# Patient Record
Sex: MALE | Race: WHITE | Employment: UNEMPLOYED | URBAN - METROPOLITAN AREA
[De-identification: names, ages, dates, MRNs, and addresses within clinical notes are randomized per-mention and may not be internally consistent; named-entity substitution may affect disease eponyms.]

---

## 2017-01-24 ENCOUNTER — HOSPITAL ENCOUNTER (EMERGENCY)
Age: 62
Discharge: HOME | End: 2017-01-24
Attending: EMERGENCY MEDICINE | Admitting: EMERGENCY MEDICINE
Payer: SELF-PAY

## 2017-01-24 ENCOUNTER — APPOINTMENT (OUTPATIENT)
Dept: GENERAL RADIOLOGY | Age: 62
End: 2017-01-24
Attending: EMERGENCY MEDICINE
Payer: SELF-PAY

## 2017-01-24 VITALS
BODY MASS INDEX: 22.53 KG/M2 | SYSTOLIC BLOOD PRESSURE: 120 MMHG | HEIGHT: 73 IN | WEIGHT: 170 LBS | OXYGEN SATURATION: 99 % | DIASTOLIC BLOOD PRESSURE: 72 MMHG | TEMPERATURE: 98.1 F | HEART RATE: 83 BPM | RESPIRATION RATE: 18 BRPM

## 2017-01-24 DIAGNOSIS — M19.90 ARTHRITIS: Primary | ICD-10-CM

## 2017-01-24 DIAGNOSIS — M25.511 PAIN IN JOINT OF RIGHT SHOULDER: ICD-10-CM

## 2017-01-24 PROCEDURE — 96372 THER/PROPH/DIAG INJ SC/IM: CPT

## 2017-01-24 PROCEDURE — 99282 EMERGENCY DEPT VISIT SF MDM: CPT

## 2017-01-24 PROCEDURE — 74011250636 HC RX REV CODE- 250/636: Performed by: EMERGENCY MEDICINE

## 2017-01-24 PROCEDURE — 73030 X-RAY EXAM OF SHOULDER: CPT

## 2017-01-24 RX ORDER — NAPROXEN 500 MG/1
500 TABLET ORAL 2 TIMES DAILY WITH MEALS
Qty: 20 TAB | Refills: 0 | Status: SHIPPED | OUTPATIENT
Start: 2017-01-24

## 2017-01-24 RX ORDER — HYDROCODONE BITARTRATE AND ACETAMINOPHEN 5; 325 MG/1; MG/1
TABLET ORAL
Qty: 12 TAB | Refills: 0 | Status: SHIPPED | OUTPATIENT
Start: 2017-01-24

## 2017-01-24 RX ORDER — KETOROLAC TROMETHAMINE 30 MG/ML
60 INJECTION, SOLUTION INTRAMUSCULAR; INTRAVENOUS
Status: COMPLETED | OUTPATIENT
Start: 2017-01-24 | End: 2017-01-24

## 2017-01-24 RX ADMIN — KETOROLAC TROMETHAMINE 60 MG: 30 INJECTION, SOLUTION INTRAMUSCULAR at 11:54

## 2017-01-24 NOTE — DISCHARGE INSTRUCTIONS
Arthritis: Care Instructions  Your Care Instructions  Arthritis, also called osteoarthritis, is a breakdown of the cartilage that cushions your joints. When the cartilage wears down, your bones rub against each other. This causes pain and stiffness. Many people have some arthritis as they age. Arthritis most often affects the joints of the spine, hands, hips, knees, or feet. You can take simple measures to protect your joints, ease your pain, and help you stay active. Follow-up care is a key part of your treatment and safety. Be sure to make and go to all appointments, and call your doctor if you are having problems. It's also a good idea to know your test results and keep a list of the medicines you take. How can you care for yourself at home? · Stay at a healthy weight. Being overweight puts extra strain on your joints. · Talk to your doctor or physical therapist about exercises that will help ease joint pain. ¨ Stretch. You may enjoy gentle forms of yoga to help keep your joints and muscles flexible. ¨ Walk instead of jog. Other types of exercise that are less stressful on the joints include riding a bicycle, swimming, or water exercise. ¨ Lift weights. Strong muscles help reduce stress on your joints. Stronger thigh muscles, for example, take some of the stress off of the knees and hips. Learn the right way to lift weights so you do not make joint pain worse. · Take your medicines exactly as prescribed. Call your doctor if you think you are having a problem with your medicine. · Take pain medicines exactly as directed. ¨ If the doctor gave you a prescription medicine for pain, take it as prescribed. ¨ If you are not taking a prescription pain medicine, ask your doctor if you can take an over-the-counter medicine. · Use a cane, crutch, walker, or another device if you need help to get around. These can help rest your joints.  You also can use other things to make life easier, such as a higher toilet seat and padded handles on kitchen utensils. · Do not sit in low chairs, which can make it hard to get up. · Put heat or cold on your sore joints as needed. Use whichever helps you most. You also can take turns with hot and cold packs. ¨ Apply heat 2 or 3 times a day for 20 to 30 minutes--using a heating pad, hot shower, or hot pack--to relieve pain and stiffness. ¨ Put ice or a cold pack on your sore joint for 10 to 20 minutes at a time. Put a thin cloth between the ice and your skin. When should you call for help? Call your doctor now or seek immediate medical care if:  · You have sudden swelling, warmth, or pain in any joint. · You have joint pain and a fever or rash. · You have such bad pain that you cannot use a joint. Watch closely for changes in your health, and be sure to contact your doctor if:  · You have mild joint symptoms that continue even with more than 6 weeks of care at home. · You have stomach pain or other problems with your medicine. Where can you learn more? Go to http://faithJibbigomichelle.info/. Enter T936 in the search box to learn more about \"Arthritis: Care Instructions. \"  Current as of: February 24, 2016  Content Version: 11.1  © 7178-1598 [employer]. Care instructions adapted under license by [employer] (which disclaims liability or warranty for this information). If you have questions about a medical condition or this instruction, always ask your healthcare professional. [place] [address] any warranty or liability for your use of this information. Joint Pain: Care Instructions  Your Care Instructions  Many people have small aches and pains from overuse or injury to muscles and joints. Joint injuries often happen during sports or recreation, work tasks, or projects around the home.  An overuse injury can happen when you put too much stress on a joint or when you do an activity that stresses the joint over and over, such as using the computer or rowing a boat. You can take action at home to help your muscles and joints get better. You should feel better in 1 to 2 weeks, but it can take 3 months or more to heal completely. Follow-up care is a key part of your treatment and safety. Be sure to make and go to all appointments, and call your doctor if you are having problems. It's also a good idea to know your test results and keep a list of the medicines you take. How can you care for yourself at home? · Do not put weight on the injured joint for at least a day or two. · For the first day or two after an injury, do not take hot showers or baths, and do not use hot packs. The heat could make swelling worse. · Put ice or a cold pack on the sore joint for 10 to 20 minutes at a time. Try to do this every 1 to 2 hours for the next 3 days (when you are awake) or until the swelling goes down. Put a thin cloth between the ice and your skin. · Wrap the injury in an elastic bandage. Do not wrap it too tightly because this can cause more swelling. · Prop up the sore joint on a pillow when you ice it or anytime you sit or lie down during the next 3 days. Try to keep it above the level of your heart. This will help reduce swelling. · Take an over-the-counter pain medicine, such as acetaminophen (Tylenol), ibuprofen (Advil, Motrin), or naproxen (Aleve). Read and follow all instructions on the label. · After 1 or 2 days of rest, begin moving the joint gently. While the joint is still healing, you can begin to exercise using activities that do not strain or hurt the painful joint. When should you call for help? Call your doctor now or seek immediate medical care if:  · You have signs of infection, such as:  ¨ Increased pain, swelling, warmth, and redness. ¨ Red streaks leading from the joint. ¨ A fever.   Watch closely for changes in your health, and be sure to contact your doctor if:  · Your movement or symptoms are not getting better after 1 to 2 weeks of home treatment. Where can you learn more? Go to http://faith-michelle.info/. Enter P205 in the search box to learn more about \"Joint Pain: Care Instructions. \"  Current as of: May 23, 2016  Content Version: 11.1  © 5214-2981 [employer]. Care instructions adapted under license by [employer] (which disclaims liability or warranty for this information). If you have questions about a medical condition or this instruction, always ask your healthcare professional. [address] any warranty or liability for your use of this information.

## 2017-05-09 ENCOUNTER — HOSPITAL ENCOUNTER (EMERGENCY)
Age: 62
Discharge: HOME | End: 2017-05-09
Attending: EMERGENCY MEDICINE
Payer: SELF-PAY

## 2017-05-09 VITALS
HEART RATE: 71 BPM | TEMPERATURE: 98.3 F | HEIGHT: 72 IN | RESPIRATION RATE: 16 BRPM | WEIGHT: 155 LBS | OXYGEN SATURATION: 99 % | SYSTOLIC BLOOD PRESSURE: 113 MMHG | DIASTOLIC BLOOD PRESSURE: 79 MMHG | BODY MASS INDEX: 20.99 KG/M2

## 2017-05-09 DIAGNOSIS — M25.511 CHRONIC RIGHT SHOULDER PAIN: ICD-10-CM

## 2017-05-09 DIAGNOSIS — Z76.0 MEDICATION REFILL: Primary | ICD-10-CM

## 2017-05-09 DIAGNOSIS — G89.29 CHRONIC RIGHT SHOULDER PAIN: ICD-10-CM

## 2017-05-09 PROCEDURE — 99282 EMERGENCY DEPT VISIT SF MDM: CPT

## 2017-05-09 PROCEDURE — 77030029684 HC NEB SM VOL KT MONA -A

## 2017-05-09 PROCEDURE — 96372 THER/PROPH/DIAG INJ SC/IM: CPT

## 2017-05-09 PROCEDURE — 74011250636 HC RX REV CODE- 250/636: Performed by: PHYSICIAN ASSISTANT

## 2017-05-09 PROCEDURE — 94640 AIRWAY INHALATION TREATMENT: CPT

## 2017-05-09 PROCEDURE — 74011000250 HC RX REV CODE- 250: Performed by: PHYSICIAN ASSISTANT

## 2017-05-09 RX ORDER — ALBUTEROL SULFATE 90 UG/1
2 AEROSOL, METERED RESPIRATORY (INHALATION)
Status: DISCONTINUED | OUTPATIENT
Start: 2017-05-09 | End: 2017-05-09 | Stop reason: HOSPADM

## 2017-05-09 RX ORDER — IPRATROPIUM BROMIDE AND ALBUTEROL SULFATE 2.5; .5 MG/3ML; MG/3ML
3 SOLUTION RESPIRATORY (INHALATION)
Status: COMPLETED | OUTPATIENT
Start: 2017-05-09 | End: 2017-05-09

## 2017-05-09 RX ORDER — METHOCARBAMOL 500 MG/1
500 TABLET, FILM COATED ORAL 4 TIMES DAILY
Qty: 20 TAB | Refills: 0 | Status: SHIPPED | OUTPATIENT
Start: 2017-05-09

## 2017-05-09 RX ORDER — KETOROLAC TROMETHAMINE 30 MG/ML
60 INJECTION, SOLUTION INTRAMUSCULAR; INTRAVENOUS
Status: COMPLETED | OUTPATIENT
Start: 2017-05-09 | End: 2017-05-09

## 2017-05-09 RX ORDER — ALBUTEROL SULFATE 90 UG/1
2 AEROSOL, METERED RESPIRATORY (INHALATION)
Qty: 1 INHALER | Refills: 0 | Status: SHIPPED | OUTPATIENT
Start: 2017-05-09 | End: 2017-08-26

## 2017-05-09 RX ORDER — ALBUTEROL SULFATE 0.83 MG/ML
2.5 SOLUTION RESPIRATORY (INHALATION)
Qty: 30 EACH | Refills: 0 | Status: SHIPPED | OUTPATIENT
Start: 2017-05-09 | End: 2017-08-26

## 2017-05-09 RX ADMIN — IPRATROPIUM BROMIDE AND ALBUTEROL SULFATE 3 ML: .5; 3 SOLUTION RESPIRATORY (INHALATION) at 11:27

## 2017-05-09 RX ADMIN — KETOROLAC TROMETHAMINE 60 MG: 30 INJECTION, SOLUTION INTRAMUSCULAR at 11:27

## 2017-05-09 NOTE — DISCHARGE INSTRUCTIONS
Joint Pain: Care Instructions  Your Care Instructions  Many people have small aches and pains from overuse or injury to muscles and joints. Joint injuries often happen during sports or recreation, work tasks, or projects around the home. An overuse injury can happen when you put too much stress on a joint or when you do an activity that stresses the joint over and over, such as using the computer or rowing a boat. You can take action at home to help your muscles and joints get better. You should feel better in 1 to 2 weeks, but it can take 3 months or more to heal completely. Follow-up care is a key part of your treatment and safety. Be sure to make and go to all appointments, and call your doctor if you are having problems. It's also a good idea to know your test results and keep a list of the medicines you take. How can you care for yourself at home? · Do not put weight on the injured joint for at least a day or two. · For the first day or two after an injury, do not take hot showers or baths, and do not use hot packs. The heat could make swelling worse. · Put ice or a cold pack on the sore joint for 10 to 20 minutes at a time. Try to do this every 1 to 2 hours for the next 3 days (when you are awake) or until the swelling goes down. Put a thin cloth between the ice and your skin. · Wrap the injury in an elastic bandage. Do not wrap it too tightly because this can cause more swelling. · Prop up the sore joint on a pillow when you ice it or anytime you sit or lie down during the next 3 days. Try to keep it above the level of your heart. This will help reduce swelling. · Take an over-the-counter pain medicine, such as acetaminophen (Tylenol), ibuprofen (Advil, Motrin), or naproxen (Aleve). Read and follow all instructions on the label. · After 1 or 2 days of rest, begin moving the joint gently.  While the joint is still healing, you can begin to exercise using activities that do not strain or hurt the painful joint. When should you call for help? Call your doctor now or seek immediate medical care if:  · You have signs of infection, such as:  ¨ Increased pain, swelling, warmth, and redness. ¨ Red streaks leading from the joint. ¨ A fever. Watch closely for changes in your health, and be sure to contact your doctor if:  · Your movement or symptoms are not getting better after 1 to 2 weeks of home treatment. Where can you learn more? Go to http://faith-michelle.info/. Enter P205 in the search box to learn more about \"Joint Pain: Care Instructions. \"  Current as of: May 23, 2016  Content Version: 11.2  © 9277-4030 [employer]. Care instructions adapted under license by [employer] (which disclaims liability or warranty for this information). If you have questions about a medical condition or this instruction, always ask your healthcare professional. [address] any warranty or liability for your use of this information.

## 2017-05-09 NOTE — ED PROVIDER NOTES
HPI Comments: 10:34 AM Jane Zarate is a 60 y.o. male with a h/o COPD, and Smoking presents to the ED for albuterol refill. Pt states his is almost out of his albuterol inhaler and is completely out of nebulizer solution. He feels his COPD has been doing well overall. Also c/o ongoing right shoulder pain, states he received an injection for it in the past which helped. Pt denies nausea, vomiting, diarrhea, chest pain, or cough. All other sx denied. No other complaints at this time. PCP-None      The history is provided by the patient. Past Medical History:   Diagnosis Date    Chronic obstructive pulmonary disease ([facility] [address]     Tobacco abuse        No past surgical history on file. No family history on file. Social History     Social History    Marital status: SINGLE     Spouse name: N/A    Number of children: N/A    Years of education: N/A     Occupational History    Not on file. Social History Main Topics    Smoking status: Former Smoker     Packs/day: 0.00     Years: 30.00     Types: Cigarettes     Start date: 10/11/1968    Smokeless tobacco: Never Used    Alcohol use No    Drug use: No    Sexual activity: Yes     Partners: Female     Other Topics Concern    Not on file     Social History Narrative         ALLERGIES: Review of patient's allergies indicates no known allergies. Review of Systems   Constitutional: Negative for fever. Eyes: Negative for visual disturbance. Respiratory: Negative for shortness of breath. Cardiovascular: Negative for chest pain and leg swelling. Gastrointestinal: Negative for abdominal pain, blood in stool and vomiting. Genitourinary: Negative for dysuria and flank pain. Musculoskeletal: Positive for arthralgias. Negative for neck pain. Skin: Negative for rash. Neurological: Negative for headaches. Hematological: Does not bruise/bleed easily. Psychiatric/Behavioral: Negative for confusion.    All other systems reviewed and are negative. Vitals:    05/09/17 0956   BP: 113/79   Pulse: 71   Resp: 16   Temp: 98.3 °F (36.8 °C)   SpO2: 99%   Weight: 70.3 kg (155 lb)   Height: 6' (1.829 m)            Physical Exam   Constitutional: He is oriented to person, place, and time. He appears well-developed and well-nourished. No distress. HENT:   Head: Normocephalic and atraumatic. Eyes: Conjunctivae are normal.   Neck: Normal range of motion. Neck supple. Cardiovascular: Normal rate, regular rhythm and normal heart sounds. Pulmonary/Chest: Effort normal. No respiratory distress. He has wheezes (mild). He has no rales. Musculoskeletal: Normal range of motion. Neurological: He is alert and oriented to person, place, and time. Skin: Skin is warm and dry. Psychiatric: He has a normal mood and affect. His behavior is normal. Judgment and thought content normal.   Nursing note and vitals reviewed. MDM  Number of Diagnoses or Management Options  Chronic right shoulder pain:   Medication refill:     ED Course       Procedures                       Scribe Attestation  Mane Link scribing for and in the presence of Inessa Martin 10:36 AM, 05/09/17. Physician Attestation  I personally performed the services described in the documentation, reviewed the documentation, as recorded by the scribe in my presence, and it accurately and completely records my words and actions. ITA Martin 10:36 AM 05/09/17        Signed by : Brain Nguyen, 05/09/17 at 10:36 AM        -------------------------------------------------------------------------------------------------------------------     EKG INTERPRETATIONS:      RADIOLOGY RESULTS:   No orders to display       LABORATORY RESULTS:  No results found for this or any previous visit (from the past 12 hour(s)). CONSULTATIONS:        PROGRESS NOTES:    10:48 AM Pt well appearing and in NAD. Here for refill of meds. Gave toradol IM, muscle relaxers.  Referred to [facility] for further management. Lengthy D/W pt regarding possible worsening of pt's condition, need for follow up and strict ED return instructions for any worsening symptoms. DISPOSITION:  ED DIAGNOSIS & DISPOSITION INFORMATION  Diagnosis:   1. Medication refill    2. Chronic right shoulder pain          Disposition: home    Follow-up Information     Follow up With Details Comments [address] [facility] Call today To make a follow up appointment [address] [facility] [address] [phone]    [facility] EMERGENCY DEPT  Immediately if symptoms worsen [address] [facility] [address]  [phone]          Patient's Medications   Start Taking    ALBUTEROL (PROVENTIL HFA, VENTOLIN HFA, PROAIR HFA) 90 MCG/ACTUATION INHALER    Take 2 Puffs by inhalation every four (4) hours as needed for Wheezing. ALBUTEROL (PROVENTIL VENTOLIN) 2.5 MG /3 ML (0.083 %) NEBULIZER SOLUTION    3 mL by Nebulization route every four (4) hours as needed for Wheezing. METHOCARBAMOL (ROBAXIN) 500 MG TABLET    Take 1 Tab by mouth four (4) times daily. Continue Taking    ALBUTEROL (PROVENTIL VENTOLIN) 2.5 MG /3 ML (0.083 %) NEBULIZER SOLUTION    3 mL by Nebulization route every four (4) hours as needed for Wheezing. ASPIRIN 81 MG CHEWABLE TABLET    Take 81 mg by mouth daily. DIPHENHYDRAMINE (BENADRYL) 25 MG CAPSULE    Take 1 Cap by mouth every six (6) hours as needed. Indications: ALLERGIC REACTION, PRURITUS OF SKIN    FLUTICASONE (FLOVENT HFA) 110 MCG/ACTUATION INHALER    Take 1 Puff by inhalation every twelve (12) hours. HYDROCODONE-ACETAMINOPHEN (NORCO) 5-325 MG PER TABLET    Take 1-2 tablets PO every 4-6 hours as needed for pain control. If over the counter ibuprofen or acetaminophen was suggested, then only take the vicodin for pain not well controlled with the over the counter medication. METHOCARBAMOL (ROBAXIN) 500 MG TABLET    Take 1 Tab by mouth four (4) times daily. NAPROXEN (NAPROSYN) 500 MG TABLET    Take 1 Tab by mouth two (2) times daily (with meals). PREDNISONE (STERAPRED DS) 10 MG DOSE PACK    6 day dose pack per package instructions    TRIAMCINOLONE ACETONIDE (KENALOG) 0.1 % OINTMENT    Apply  to affected area two (2) times a day.  use thin layer   These Medications have changed    No medications on file   Stop Taking    No medications on file

## 2017-05-09 NOTE — ED TRIAGE NOTES
Shoulder pain right side has been hurting off and on since September. Has been seen here several times for the shoulder. States that he also is about out of his albuterol inhaler.

## 2017-08-26 ENCOUNTER — HOSPITAL ENCOUNTER (EMERGENCY)
Age: 62
Discharge: HOME | End: 2017-08-26
Attending: EMERGENCY MEDICINE | Admitting: EMERGENCY MEDICINE
Payer: SELF-PAY

## 2017-08-26 ENCOUNTER — APPOINTMENT (OUTPATIENT)
Dept: GENERAL RADIOLOGY | Age: 62
End: 2017-08-26
Attending: EMERGENCY MEDICINE
Payer: SELF-PAY

## 2017-08-26 VITALS
OXYGEN SATURATION: 98 % | TEMPERATURE: 98.7 F | HEART RATE: 76 BPM | SYSTOLIC BLOOD PRESSURE: 141 MMHG | DIASTOLIC BLOOD PRESSURE: 96 MMHG | RESPIRATION RATE: 18 BRPM

## 2017-08-26 DIAGNOSIS — Z87.891 CESSATION OF TOBACCO USE IN PREVIOUS 12 MONTHS: ICD-10-CM

## 2017-08-26 DIAGNOSIS — J44.1 COPD EXACERBATION (HCC): Primary | ICD-10-CM

## 2017-08-26 PROCEDURE — 74011000250 HC RX REV CODE- 250: Performed by: EMERGENCY MEDICINE

## 2017-08-26 PROCEDURE — 93005 ELECTROCARDIOGRAM TRACING: CPT

## 2017-08-26 PROCEDURE — 71010 XR CHEST PORT: CPT

## 2017-08-26 PROCEDURE — 74011636637 HC RX REV CODE- 636/637: Performed by: EMERGENCY MEDICINE

## 2017-08-26 PROCEDURE — 74011250637 HC RX REV CODE- 250/637: Performed by: PHYSICIAN ASSISTANT

## 2017-08-26 PROCEDURE — 94640 AIRWAY INHALATION TREATMENT: CPT

## 2017-08-26 PROCEDURE — 99284 EMERGENCY DEPT VISIT MOD MDM: CPT

## 2017-08-26 RX ORDER — ALBUTEROL SULFATE 0.83 MG/ML
5 SOLUTION RESPIRATORY (INHALATION)
Status: COMPLETED | OUTPATIENT
Start: 2017-08-26 | End: 2017-08-26

## 2017-08-26 RX ORDER — PREDNISONE 20 MG/1
60 TABLET ORAL
Status: COMPLETED | OUTPATIENT
Start: 2017-08-26 | End: 2017-08-26

## 2017-08-26 RX ORDER — ALBUTEROL SULFATE 90 UG/1
1 AEROSOL, METERED RESPIRATORY (INHALATION)
Qty: 1 INHALER | Refills: 1 | Status: SHIPPED | OUTPATIENT
Start: 2017-08-26

## 2017-08-26 RX ORDER — ALBUTEROL SULFATE 90 UG/1
2 AEROSOL, METERED RESPIRATORY (INHALATION)
Status: DISCONTINUED | OUTPATIENT
Start: 2017-08-26 | End: 2017-08-26 | Stop reason: HOSPADM

## 2017-08-26 RX ORDER — ALBUTEROL SULFATE 0.83 MG/ML
2.5 SOLUTION RESPIRATORY (INHALATION)
Qty: 30 EACH | Refills: 0 | Status: SHIPPED | OUTPATIENT
Start: 2017-08-26

## 2017-08-26 RX ORDER — IPRATROPIUM BROMIDE AND ALBUTEROL SULFATE 2.5; .5 MG/3ML; MG/3ML
3 SOLUTION RESPIRATORY (INHALATION)
Status: COMPLETED | OUTPATIENT
Start: 2017-08-26 | End: 2017-08-26

## 2017-08-26 RX ORDER — PREDNISONE 50 MG/1
50 TABLET ORAL DAILY
Qty: 5 TAB | Refills: 0 | Status: SHIPPED | OUTPATIENT
Start: 2017-08-26 | End: 2017-08-31

## 2017-08-26 RX ADMIN — PREDNISONE 60 MG: 20 TABLET ORAL at 06:18

## 2017-08-26 RX ADMIN — ALBUTEROL SULFATE 2 PUFF: 90 AEROSOL, METERED RESPIRATORY (INHALATION) at 06:56

## 2017-08-26 RX ADMIN — ALBUTEROL SULFATE 5 MG: 2.5 SOLUTION RESPIRATORY (INHALATION) at 06:16

## 2017-08-26 RX ADMIN — IPRATROPIUM BROMIDE AND ALBUTEROL SULFATE 3 ML: .5; 3 SOLUTION RESPIRATORY (INHALATION) at 05:33

## 2017-08-26 NOTE — ED PROVIDER NOTES
HPI Comments: 5:36 AM: Cecilia Shin is a 60 y.o. male with hx of COPD presenting to the ED with a 2 day hx of worsening SOB. Pt states 2 days ago he ran out of his albuterol medications. States since running out of the medication he has had worsening SOB that is exacerbated with he attempts to lay flat. Notes mild improvement if he is sitting upright. Denies fever, chills, CP, abd pain, nausea, vomiting, diarrhea, headache, coughing. The history is provided by the patient. Past Medical History:   Diagnosis Date    Chronic obstructive pulmonary disease ([address]     Tobacco abuse        History reviewed. No pertinent surgical history. History reviewed. No pertinent family history. Social History     Social History    Marital status: SINGLE     Spouse name: N/A    Number of children: N/A    Years of education: N/A     Occupational History    Not on file. Social History Main Topics    Smoking status: Former Smoker     Packs/day: 0.00     Years: 30.00     Types: Cigarettes     Start date: 10/11/1968    Smokeless tobacco: Never Used    Alcohol use No    Drug use: No    Sexual activity: Yes     Partners: Female     Other Topics Concern    Not on file     Social History Narrative         ALLERGIES: Review of patient's allergies indicates no known allergies. Review of Systems   HENT: Negative for congestion. Genitourinary: Negative for dysuria. Musculoskeletal: Negative. Neurological: Negative for light-headedness. All other systems reviewed and are negative. Vitals:    08/26/17 0511   BP: 142/90   Pulse: 76   Resp: 18   Temp: 98.7 °F (37.1 °C)   SpO2: 98%            Physical Exam   Constitutional: He is oriented to person, place, and time. HENT:   Head: Atraumatic. Eyes: Conjunctivae are normal.   Neck: Neck supple. Cardiovascular: Normal rate, regular rhythm and normal heart sounds. Pulmonary/Chest: Effort normal. No respiratory distress.  He has wheezes (diffuse). He exhibits no tenderness. Diminished BS throughout   Abdominal: Soft. Bowel sounds are normal. He exhibits no distension. There is no tenderness. There is no rebound and no guarding. Musculoskeletal: Normal range of motion. He exhibits no edema or tenderness. Neurological: He is alert and oriented to person, place, and time. Skin: Skin is warm and dry. Psychiatric: He has a normal mood and affect. Nursing note and vitals reviewed. MDM  Number of Diagnoses or Management Options  Cessation of tobacco use in previous 12 months:   COPD exacerbation [facility]):   Diagnosis management comments: Differential: COPD exacerbation; PNA; pleural effusion; malignancy    Give nebs, steroid. Order CXR. Reassess. 5:54 AM  Care turned over to oncoming provider at end of shift. Amount and/or Complexity of Data Reviewed  Tests in the radiology section of CPT®: ordered and reviewed      ED Course     Procedures    Vitals:  Patient Vitals for the past 12 hrs:   Temp Pulse Resp BP SpO2   08/26/17 0511 98.7 °F (37.1 °C) 76 18 142/90 98 %       Medications Ordered:  Medications   albuterol (PROVENTIL VENTOLIN) nebulizer solution 5 mg (not administered)   predniSONE (DELTASONE) tablet 60 mg (not administered)   albuterol-ipratropium (DUO-NEB) 2.5 MG-0.5 MG/3 ML (3 mL Nebulization Given 8/26/17 0533)       Lab Findings:  No results found for this or any previous visit (from the past 12 hour(s)). EKG Interpretation by ED physician:      X-ray, CT or radiology findings or impressions:  XR CHEST PORT    (Results Pending)       Progress notes, consult notes, or additional procedure notes:      Reevaluation of the patient:       Diagnosis:   1.  COPD exacerbation (HCC)    2. Cessation of tobacco use in previous 12 months        Disposition: tbd; care turned over to oncoming provider at end of shift    Follow-up Information     None           Patient's Medications   Start Taking    ALBUTEROL (PROVENTIL HFA, VENTOLIN HFA, PROAIR HFA) 90 MCG/ACTUATION INHALER    Take 1 Puff by inhalation every four (4) hours as needed for Wheezing. PREDNISONE (DELTASONE) 50 MG TABLET    Take 1 Tab by mouth daily for 5 days. Continue Taking    ASPIRIN 81 MG CHEWABLE TABLET    Take 81 mg by mouth daily. DIPHENHYDRAMINE (BENADRYL) 25 MG CAPSULE    Take 1 Cap by mouth every six (6) hours as needed. Indications: ALLERGIC REACTION, PRURITUS OF SKIN    FLUTICASONE (FLOVENT HFA) 110 MCG/ACTUATION INHALER    Take 1 Puff by inhalation every twelve (12) hours. HYDROCODONE-ACETAMINOPHEN (NORCO) 5-325 MG PER TABLET    Take 1-2 tablets PO every 4-6 hours as needed for pain control. If over the counter ibuprofen or acetaminophen was suggested, then only take the vicodin for pain not well controlled with the over the counter medication. METHOCARBAMOL (ROBAXIN) 500 MG TABLET    Take 1 Tab by mouth four (4) times daily. METHOCARBAMOL (ROBAXIN) 500 MG TABLET    Take 1 Tab by mouth four (4) times daily. NAPROXEN (NAPROSYN) 500 MG TABLET    Take 1 Tab by mouth two (2) times daily (with meals). TRIAMCINOLONE ACETONIDE (KENALOG) 0.1 % OINTMENT    Apply  to affected area two (2) times a day. use thin layer   These Medications have changed    No medications on file   Stop Taking    ALBUTEROL (PROVENTIL HFA, VENTOLIN HFA, PROAIR HFA) 90 MCG/ACTUATION INHALER    Take 2 Puffs by inhalation every four (4) hours as needed for Wheezing. ALBUTEROL (PROVENTIL VENTOLIN) 2.5 MG /3 ML (0.083 %) NEBULIZER SOLUTION    3 mL by Nebulization route every four (4) hours as needed for Wheezing. ALBUTEROL (PROVENTIL VENTOLIN) 2.5 MG /3 ML (0.083 %) NEBULIZER SOLUTION    3 mL by Nebulization route every four (4) hours as needed for Wheezing.     PREDNISONE (STERAPRED DS) 10 MG DOSE PACK    6 day dose pack per package instructions       Scribe Attestation      Vesna Rao acting as a scribe for and in the presence of Mira JEAN BAPTISTE      August 26, 2017 at 5:35 AM       Provider Attestation:      I personally performed the services described in the documentation, reviewed the documentation, as recorded by the scribe in my presence, and it accurately and completely records my words and actions. August 26, 2017 at Leta Ruiz [address], DO      6:00 AM Assumed care of pt. Resting comfortably at this time without complaints. Awaiting CXR, Emilia Donaldson PA-C     6:41 AM Pt feeling better after 2nd neb, would like to go home. CXR without acute process. Will d/h with refills of meds.  Emilia Donaldson PA-C

## 2017-08-26 NOTE — ED NOTES
Pt arrived to the ED with co SOB, CP, and nausea x 2 days. Pt also asking for medication refill. Pt needs albuterol inhaler and nebulizer. Pt states he needs the prescription from [facility] he has no PCP x 3 years. Pt states they say they cant see him [facility] of his  license.

## 2017-08-26 NOTE — DISCHARGE INSTRUCTIONS
Chronic Obstructive Pulmonary Disease (COPD): Care Instructions  Your Care Instructions    Chronic obstructive pulmonary disease (COPD) is a general term for a group of lung diseases, including emphysema and chronic bronchitis. People with COPD have decreased airflow in and out of the lungs, which makes it hard to breathe. The airways also can get clogged with thick mucus. Cigarette smoking is a major cause of COPD. Although there is no cure for COPD, you can slow its progress. Following your treatment plan and taking care of yourself can help you feel better and live longer. Follow-up care is a key part of your treatment and safety. Be sure to make and go to all appointments, and call your doctor if you are having problems. It's also a good idea to know your test results and keep a list of the medicines you take. How can you care for yourself at home? Staying healthy  · Do not smoke. This is the most important step you can take to prevent more damage to your lungs. If you need help quitting, talk to your doctor about stop-smoking programs and medicines. These can increase your chances of quitting for good. · Avoid colds and flu. Get a pneumococcal vaccine shot. If you have had one before, ask your doctor whether you need a second dose. Get the flu vaccine every fall. If you must be around people with colds or the flu, wash your hands often. · Avoid secondhand smoke, air pollution, and high altitudes. Also avoid cold, dry air and hot, humid air. Stay at home with your windows closed when air pollution is bad. Medicines and oxygen therapy  · Take your medicines exactly as prescribed. Call your doctor if you think you are having a problem with your medicine. · You may be taking medicines such as:  ¨ Bronchodilators. These help open your airways and make breathing easier. Bronchodilators are either short-acting (work for 6 to 9 hours) or long-acting (work for 24 hours).  You inhale most bronchodilators, so they start to act quickly. Always carry your quick-relief inhaler with you in case you need it while you are away from home. ¨ Corticosteroids (prednisone, budesonide). These reduce airway inflammation. They come in pill or inhaled form. You must take these medicines every day for them to work well. · A spacer may help you get more inhaled medicine to your lungs. Ask your doctor or pharmacist if a spacer is right for you. If it is, ask how to use it properly. · Do not take any vitamins, over-the-counter medicine, or herbal products without talking to your doctor first.  · If your doctor prescribed antibiotics, take them as directed. Do not stop taking them just because you feel better. You need to take the full course of antibiotics. · Oxygen therapy boosts the amount of oxygen in your blood and helps you breathe easier. Use the flow rate your doctor has recommended, and do not change it without talking to your doctor first.  Activity  · Get regular exercise. Walking is an easy way to get exercise. Start out slowly, and walk a little more each day. · Pay attention to your breathing. You are exercising too hard if you cannot talk while you are exercising. · Take short rest breaks when doing household chores and other activities. · Learn breathing methods--such as breathing through pursed lips--to help you become less short of breath. · If your doctor has not set you up with a pulmonary rehabilitation program, talk to him or her about whether rehab is right for you. Rehab includes exercise programs, education about your disease and how to manage it, help with diet and other changes, and emotional support. Diet  · Eat regular, healthy meals. Use bronchodilators about 1 hour before you eat to make it easier to eat. Eat several small meals instead of three large ones. Drink beverages at the end of the meal. Avoid foods that are hard to chew.   · Eat foods that contain protein so that you do not lose muscle mass.  · Talk with your doctor if you gain too much weight or if you lose weight without trying. Mental health  · Talk to your family, friends, or a therapist about your feelings. It is normal to feel frightened, angry, hopeless, helpless, and even guilty. Talking openly about bad feelings can help you cope. If these feelings last, talk to your doctor. When should you call for help? Call 911 anytime you think you may need emergency care. For example, call if:  · You have severe trouble breathing. Call your doctor now or seek immediate medical care if:  · You have new or worse trouble breathing. · You cough up blood. · You have a fever. Watch closely for changes in your health, and be sure to contact your doctor if:  · You cough more deeply or more often, especially if you notice more mucus or a change in the color of your mucus. · You have new or worse swelling in your legs or belly. · You are not getting better as expected. Where can you learn more? Go to http://[facility].info/. Jeremiah Mercado in the search box to learn more about \"Chronic Obstructive Pulmonary Disease (COPD): Care Instructions. \"  Current as of: March 25, 2017  Content Version: 11.3  © 9512-5221 [employer]. Care instructions adapted under license by [employer] (which disclaims liability or warranty for this information). If you have questions about a medical condition or this instruction, always ask your healthcare professional. [place] [address] any warranty or liability for your use of this information.

## 2017-08-26 NOTE — ED NOTES
I have reviewed discharge instructions with the patient. The patient verbalized understanding. Discharge medications reviewed with patient and appropriate educational materials and side effects teaching were provided. Pt states that his breathing is feeling better at this time. Pt ambulated out of ED with steady gait.

## 2017-08-27 LAB
ATRIAL RATE: 79 BPM
CALCULATED P AXIS, ECG09: 80 DEGREES
CALCULATED R AXIS, ECG10: 82 DEGREES
CALCULATED T AXIS, ECG11: 46 DEGREES
DIAGNOSIS, 93000: NORMAL
P-R INTERVAL, ECG05: 110 MS
Q-T INTERVAL, ECG07: 366 MS
QRS DURATION, ECG06: 82 MS
QTC CALCULATION (BEZET), ECG08: 419 MS
VENTRICULAR RATE, ECG03: 79 BPM

## 2021-06-11 NOTE — ED PROVIDER NOTES
HPI Comments: 11:30 AM Enrique Carbajal is a 57 y.o. male with a history of COPD and tobacco abuse who presents to ED c/o right shoulder pain onset 2 months ago. Pt explains the pain is making it difficult for him to preform activities of daily living. Pt denies any recent fall or trauma. Pt was seen and treated in the ER 6-8 months ago for shoulder pain. Pt states he was given pain medication at this time. No other concerns at this time. Patient is a 57 y.o. male presenting with shoulder pain. The history is provided by the patient. Shoulder Pain           Past Medical History:   Diagnosis Date    Chronic obstructive pulmonary disease ([facility] [address]     Tobacco abuse        History reviewed. No pertinent past surgical history. History reviewed. No pertinent family history. Social History     Social History    Marital status: SINGLE     Spouse name: N/A    Number of children: N/A    Years of education: N/A     Occupational History    Not on file. Social History Main Topics    Smoking status: Former Smoker     Packs/day: 0.00     Years: 30.00     Types: Cigarettes     Start date: 10/11/1968    Smokeless tobacco: Never Used    Alcohol use No    Drug use: No    Sexual activity: Yes     Partners: Female     Other Topics Concern    Not on file     Social History Narrative         ALLERGIES: Review of patient's allergies indicates no known allergies. Review of Systems   Constitutional: Negative for fatigue and fever. HENT: Negative for congestion and rhinorrhea. Respiratory: Negative for chest tightness and shortness of breath. Cardiovascular: Negative for chest pain. Gastrointestinal: Negative for abdominal pain, nausea and vomiting. Musculoskeletal: Positive for arthralgias (R shoulder). Negative for back pain and joint swelling. All other systems reviewed and are negative.       Vitals:    01/24/17 1031   BP: 120/72   Pulse: 83   Resp: 18   Temp: 98.1 °F (36.7 °C)   SpO2: 99% Weight: 77.1 kg (170 lb)   Height: 6' 1\" (1.854 m)            Physical Exam   Constitutional: He appears well-developed and well-nourished. Non-toxic appearance. He does not have a sickly appearance. He does not appear ill. No distress. HENT:   Head: Normocephalic and atraumatic. Mouth/Throat: Oropharynx is clear and moist. No oropharyngeal exudate. Eyes: Conjunctivae and EOM are normal. Pupils are equal, round, and reactive to light. No scleral icterus. Neck: Normal range of motion. Neck supple. No hepatojugular reflux and no JVD present. No tracheal deviation present. No thyromegaly present. Cardiovascular: Normal rate, regular rhythm, S1 normal, S2 normal, normal heart sounds, intact distal pulses and normal pulses. Exam reveals no gallop, no S3 and no S4. No murmur heard. Pulses:       Radial pulses are 2+ on the right side, and 2+ on the left side. Dorsalis pedis pulses are 2+ on the right side, and 2+ on the left side. Pulmonary/Chest: Effort normal and breath sounds normal. No respiratory distress. He has no decreased breath sounds. He has no wheezes. He has no rhonchi. He has no rales. Abdominal: Soft. Normal appearance and bowel sounds are normal. He exhibits no distension and no mass. There is no hepatosplenomegaly. There is no tenderness. There is no rigidity, no rebound, no guarding, no CVA tenderness, no tenderness at McBurney's point and negative Abdul's sign. Musculoskeletal: Normal range of motion. Right shoulder: He exhibits tenderness. He exhibits normal range of motion, no bony tenderness, no swelling, no effusion, no crepitus, no deformity and no laceration. Lymphadenopathy:        Head (right side): No submental, no submandibular, no preauricular and no occipital adenopathy present. Head (left side): No submental, no submandibular, no preauricular and no occipital adenopathy present. He has no cervical adenopathy.         Right: No supraclavicular adenopathy present. Left: No supraclavicular adenopathy present. Neurological: He is alert. He has normal strength and normal reflexes. He is not disoriented. No cranial nerve deficit or sensory deficit. Coordination and gait normal. GCS eye subscore is 4. GCS verbal subscore is 5. GCS motor subscore is 6. Grossly intact    Skin: Skin is warm, dry and intact. No rash noted. He is not diaphoretic. Psychiatric: He has a normal mood and affect. His speech is normal and behavior is normal. Judgment and thought content normal. Cognition and memory are normal.   Nursing note and vitals reviewed. MDM  Number of Diagnoses or Management Options  Arthritis:   Pain in joint of right shoulder:   Diagnosis management comments: Shoulder pain   Arthritis   Rotator cuff tear  Neoplasm    ED Course       Procedures      Vitals:  Patient Vitals for the past 12 hrs:   Temp Pulse Resp BP SpO2   01/24/17 1031 98.1 °F (36.7 °C) 83 18 120/72 99 %     99% on RA, indicating adequate oxygenation. Medications ordered:   Medications   ketorolac tromethamine (TORADOL) 60 mg/2 mL injection 60 mg (60 mg IntraMUSCular Given 1/24/17 1154)         Lab findings:  No results found for this or any previous visit (from the past 12 hour(s)). X-Ray, CT or other radiology findings or impressions:  XR SHOULDER RT AP/LAT MIN 2 V   Final Result   IMPRESSION:      1. No acute osseous abnormality. 2. Advanced degenerative changes. .       Progress notes, Consult notes or additional Procedure notes:       Reevaluation of patient:     1:12 PM  have reassessed the patient. Patient is feeling better. Patient will be prescribed Naprosyn and Norco.  Patient was discharged in stable condition. Patient is to return to emergency department if any new or worsening condition. Disposition:  Diagnosis:   1. Arthritis    2.  Pain in joint of right shoulder        Disposition: discharged  Scribe Attestation:     Dany De La Torre ramana Boothibing for and in the presence of  [address] [place] [address], DO January 24, 2017 at 1:12 PM     Physician Attestation:   I personally performed the services described in this documentation, reviewed and edited the documentation which was dictated to the scribe in my presence, and it accurately records my words and actions.  Cyd Ganser, DO  January 24, 2017     Signed by: Brain Moise, 01/24/17, 11:33 AM English